# Patient Record
Sex: FEMALE | Race: BLACK OR AFRICAN AMERICAN | Employment: OTHER | URBAN - METROPOLITAN AREA
[De-identification: names, ages, dates, MRNs, and addresses within clinical notes are randomized per-mention and may not be internally consistent; named-entity substitution may affect disease eponyms.]

---

## 2017-01-19 DIAGNOSIS — I10 HYPERTENSION GOAL BP (BLOOD PRESSURE) < 130/80: ICD-10-CM

## 2017-01-19 DIAGNOSIS — F32.89 OTHER DEPRESSION: ICD-10-CM

## 2017-01-23 RX ORDER — DILTIAZEM HYDROCHLORIDE 120 MG/1
CAPSULE, EXTENDED RELEASE ORAL
Qty: 30 CAPSULE | Refills: 1 | Status: SHIPPED | OUTPATIENT
Start: 2017-01-23 | End: 2017-07-10 | Stop reason: SDUPTHER

## 2017-01-23 RX ORDER — BUSPIRONE HYDROCHLORIDE 5 MG/1
TABLET ORAL
Qty: 30 TABLET | Refills: 1 | Status: SHIPPED | OUTPATIENT
Start: 2017-01-23 | End: 2017-07-10 | Stop reason: SDUPTHER

## 2017-07-10 ENCOUNTER — OFFICE VISIT (OUTPATIENT)
Dept: INTERNAL MEDICINE CLINIC | Age: 82
End: 2017-07-10

## 2017-07-10 VITALS
SYSTOLIC BLOOD PRESSURE: 120 MMHG | BODY MASS INDEX: 28.53 KG/M2 | DIASTOLIC BLOOD PRESSURE: 70 MMHG | OXYGEN SATURATION: 98 % | HEART RATE: 60 BPM | HEIGHT: 63 IN | WEIGHT: 161 LBS

## 2017-07-10 DIAGNOSIS — E55.9 VITAMIN D DEFICIENCY: ICD-10-CM

## 2017-07-10 DIAGNOSIS — I10 ESSENTIAL HYPERTENSION: ICD-10-CM

## 2017-07-10 DIAGNOSIS — M25.552 PAIN OF LEFT HIP JOINT: ICD-10-CM

## 2017-07-10 DIAGNOSIS — G47.09 OTHER INSOMNIA: ICD-10-CM

## 2017-07-10 DIAGNOSIS — E11.69 TYPE 2 DIABETES MELLITUS WITH OTHER SPECIFIED COMPLICATION, WITHOUT LONG-TERM CURRENT USE OF INSULIN (HCC): Primary | ICD-10-CM

## 2017-07-10 DIAGNOSIS — F32.89 OTHER DEPRESSION: ICD-10-CM

## 2017-07-10 DIAGNOSIS — R63.4 WEIGHT LOSS: ICD-10-CM

## 2017-07-10 DIAGNOSIS — I63.89 CEREBROVASCULAR ACCIDENT (CVA) DUE TO OTHER MECHANISM (HCC): ICD-10-CM

## 2017-07-10 DIAGNOSIS — N28.9 RENAL INSUFFICIENCY: ICD-10-CM

## 2017-07-10 DIAGNOSIS — E78.5 HYPERLIPIDEMIA LDL GOAL <100: ICD-10-CM

## 2017-07-10 DIAGNOSIS — D64.89 ANEMIA DUE TO OTHER CAUSE: ICD-10-CM

## 2017-07-10 PROBLEM — I63.9 CEREBROVASCULAR ACCIDENT (CVA) (HCC): Status: ACTIVE | Noted: 2017-07-10

## 2017-07-10 LAB
GLUCOSE BLD-MCNC: 145 MG/DL
HBA1C MFR BLD: 6.2 %

## 2017-07-10 PROCEDURE — 99215 OFFICE O/P EST HI 40 MIN: CPT | Performed by: INTERNAL MEDICINE

## 2017-07-10 PROCEDURE — 83036 HEMOGLOBIN GLYCOSYLATED A1C: CPT | Performed by: INTERNAL MEDICINE

## 2017-07-10 PROCEDURE — 82962 GLUCOSE BLOOD TEST: CPT | Performed by: INTERNAL MEDICINE

## 2017-07-10 RX ORDER — BUSPIRONE HYDROCHLORIDE 5 MG/1
TABLET ORAL
Qty: 90 TABLET | Refills: 0 | Status: SHIPPED | OUTPATIENT
Start: 2017-07-10 | End: 2017-10-16 | Stop reason: SDUPTHER

## 2017-07-10 RX ORDER — HYDRALAZINE HYDROCHLORIDE 100 MG/1
100 TABLET, FILM COATED ORAL 2 TIMES DAILY
Qty: 180 TABLET | Refills: 0 | Status: SHIPPED | OUTPATIENT
Start: 2017-07-10 | End: 2018-07-16 | Stop reason: SDUPTHER

## 2017-07-10 RX ORDER — SPIRONOLACTONE 25 MG/1
25 TABLET ORAL DAILY
Qty: 90 TABLET | Refills: 0 | Status: SHIPPED | OUTPATIENT
Start: 2017-07-10 | End: 2017-09-28 | Stop reason: SDUPTHER

## 2017-07-10 RX ORDER — ASPIRIN AND DIPYRIDAMOLE 25; 200 MG/1; MG/1
CAPSULE, EXTENDED RELEASE ORAL
Qty: 180 CAPSULE | Refills: 1 | Status: SHIPPED | OUTPATIENT
Start: 2017-07-10 | End: 2018-07-02 | Stop reason: SDUPTHER

## 2017-07-10 RX ORDER — DILTIAZEM HYDROCHLORIDE 120 MG/1
CAPSULE, EXTENDED RELEASE ORAL
Qty: 90 CAPSULE | Refills: 0 | Status: SHIPPED | OUTPATIENT
Start: 2017-07-10 | End: 2017-09-28 | Stop reason: SDUPTHER

## 2017-07-10 RX ORDER — METOPROLOL SUCCINATE 25 MG/1
TABLET, EXTENDED RELEASE ORAL
Qty: 90 TABLET | Refills: 0 | Status: SHIPPED | OUTPATIENT
Start: 2017-07-10 | End: 2018-07-16 | Stop reason: SDUPTHER

## 2017-07-10 RX ORDER — TRAZODONE HYDROCHLORIDE 50 MG/1
TABLET ORAL
Qty: 90 TABLET | Refills: 0 | Status: SHIPPED | OUTPATIENT
Start: 2017-07-10 | End: 2017-10-16 | Stop reason: SDUPTHER

## 2017-07-10 RX ORDER — ATORVASTATIN CALCIUM 40 MG/1
TABLET, FILM COATED ORAL
Qty: 90 TABLET | Refills: 0 | Status: SHIPPED | OUTPATIENT
Start: 2017-07-10 | End: 2017-09-24 | Stop reason: SDUPTHER

## 2017-07-10 RX ORDER — ACETAMINOPHEN 325 MG/1
650 TABLET ORAL 2 TIMES DAILY
Qty: 120 TABLET | Refills: 0 | COMMUNITY
Start: 2017-07-10 | End: 2019-01-01

## 2017-07-10 RX ORDER — DOCUSATE SODIUM 100 MG/1
CAPSULE, LIQUID FILLED ORAL
Qty: 90 CAPSULE | Refills: 0 | Status: SHIPPED | OUTPATIENT
Start: 2017-07-10 | End: 2017-10-16 | Stop reason: SDUPTHER

## 2017-07-10 RX ORDER — SERTRALINE HYDROCHLORIDE 100 MG/1
TABLET, FILM COATED ORAL
Qty: 90 TABLET | Refills: 0 | Status: SHIPPED | OUTPATIENT
Start: 2017-07-10 | End: 2017-10-16 | Stop reason: SDUPTHER

## 2017-07-14 DIAGNOSIS — N28.9 RENAL INSUFFICIENCY: ICD-10-CM

## 2017-07-14 DIAGNOSIS — E11.69 TYPE 2 DIABETES MELLITUS WITH OTHER SPECIFIED COMPLICATION, WITHOUT LONG-TERM CURRENT USE OF INSULIN (HCC): ICD-10-CM

## 2017-07-14 DIAGNOSIS — E78.5 HYPERLIPIDEMIA LDL GOAL <100: ICD-10-CM

## 2017-07-14 DIAGNOSIS — E55.9 VITAMIN D DEFICIENCY: ICD-10-CM

## 2017-07-14 DIAGNOSIS — R63.4 WEIGHT LOSS: ICD-10-CM

## 2017-07-14 DIAGNOSIS — D64.89 ANEMIA DUE TO OTHER CAUSE: ICD-10-CM

## 2017-07-14 DIAGNOSIS — I10 ESSENTIAL HYPERTENSION: ICD-10-CM

## 2017-07-14 LAB
ALBUMIN SERPL-MCNC: 3.9 G/DL (ref 3.4–5)
ALP BLD-CCNC: 177 U/L (ref 40–129)
ALT SERPL-CCNC: 12 U/L (ref 10–40)
ANION GAP SERPL CALCULATED.3IONS-SCNC: 13 MMOL/L (ref 3–16)
AST SERPL-CCNC: 13 U/L (ref 15–37)
BASOPHILS ABSOLUTE: 0 K/UL (ref 0–0.2)
BASOPHILS RELATIVE PERCENT: 1.1 %
BILIRUB SERPL-MCNC: <0.2 MG/DL (ref 0–1)
BILIRUBIN DIRECT: <0.2 MG/DL (ref 0–0.3)
BILIRUBIN, INDIRECT: ABNORMAL MG/DL (ref 0–1)
BUN BLDV-MCNC: 27 MG/DL (ref 7–20)
CALCIUM SERPL-MCNC: 9.6 MG/DL (ref 8.3–10.6)
CHLORIDE BLD-SCNC: 106 MMOL/L (ref 99–110)
CHOLESTEROL, TOTAL: 154 MG/DL (ref 0–199)
CO2: 25 MMOL/L (ref 21–32)
CREAT SERPL-MCNC: 1.9 MG/DL (ref 0.6–1.2)
CREATININE URINE: 100.2 MG/DL (ref 28–259)
EOSINOPHILS ABSOLUTE: 0.2 K/UL (ref 0–0.6)
EOSINOPHILS RELATIVE PERCENT: 4.8 %
GFR AFRICAN AMERICAN: 30
GFR NON-AFRICAN AMERICAN: 25
GLUCOSE BLD-MCNC: 95 MG/DL (ref 70–99)
HCT VFR BLD CALC: 31.5 % (ref 36–48)
HDLC SERPL-MCNC: 70 MG/DL (ref 40–60)
HEMOGLOBIN: 9.9 G/DL (ref 12–16)
LDL CHOLESTEROL CALCULATED: 72 MG/DL
LYMPHOCYTES ABSOLUTE: 1.2 K/UL (ref 1–5.1)
LYMPHOCYTES RELATIVE PERCENT: 29 %
MCH RBC QN AUTO: 29.4 PG (ref 26–34)
MCHC RBC AUTO-ENTMCNC: 31.6 G/DL (ref 31–36)
MCV RBC AUTO: 93 FL (ref 80–100)
MICROALBUMIN UR-MCNC: <1.2 MG/DL
MICROALBUMIN/CREAT UR-RTO: NORMAL MG/G (ref 0–30)
MONOCYTES ABSOLUTE: 0.3 K/UL (ref 0–1.3)
MONOCYTES RELATIVE PERCENT: 8 %
NEUTROPHILS ABSOLUTE: 2.3 K/UL (ref 1.7–7.7)
NEUTROPHILS RELATIVE PERCENT: 57.1 %
PDW BLD-RTO: 15.6 % (ref 12.4–15.4)
PLATELET # BLD: 223 K/UL (ref 135–450)
PMV BLD AUTO: 8.9 FL (ref 5–10.5)
POTASSIUM SERPL-SCNC: 5 MMOL/L (ref 3.5–5.1)
RBC # BLD: 3.38 M/UL (ref 4–5.2)
SODIUM BLD-SCNC: 144 MMOL/L (ref 136–145)
TOTAL PROTEIN: 6.5 G/DL (ref 6.4–8.2)
TRIGL SERPL-MCNC: 59 MG/DL (ref 0–150)
TSH REFLEX: 2.02 UIU/ML (ref 0.27–4.2)
VITAMIN D 25-HYDROXY: 55.4 NG/ML
VLDLC SERPL CALC-MCNC: 12 MG/DL
WBC # BLD: 4 K/UL (ref 4–11)

## 2017-08-17 RX ORDER — LANCETS
EACH MISCELLANEOUS
Qty: 100 EACH | Refills: 3 | Status: SHIPPED | OUTPATIENT
Start: 2017-08-17 | End: 2018-01-01 | Stop reason: SDUPTHER

## 2017-09-24 DIAGNOSIS — E78.5 HYPERLIPIDEMIA LDL GOAL <100: ICD-10-CM

## 2017-09-27 RX ORDER — ATORVASTATIN CALCIUM 40 MG/1
TABLET, FILM COATED ORAL
Qty: 90 TABLET | Refills: 0 | Status: SHIPPED | OUTPATIENT
Start: 2017-09-27 | End: 2018-07-16 | Stop reason: SDUPTHER

## 2017-09-28 DIAGNOSIS — I10 ESSENTIAL HYPERTENSION: ICD-10-CM

## 2017-09-28 RX ORDER — DILTIAZEM HYDROCHLORIDE 120 MG/1
CAPSULE, EXTENDED RELEASE ORAL
Qty: 90 CAPSULE | Refills: 0 | Status: SHIPPED | OUTPATIENT
Start: 2017-09-28 | End: 2018-07-16 | Stop reason: SDUPTHER

## 2017-09-28 RX ORDER — SPIRONOLACTONE 25 MG/1
25 TABLET ORAL DAILY
Qty: 90 TABLET | Refills: 0 | Status: SHIPPED | OUTPATIENT
Start: 2017-09-28 | End: 2018-07-13 | Stop reason: SDUPTHER

## 2017-10-07 DIAGNOSIS — E78.5 HYPERLIPIDEMIA LDL GOAL <100: ICD-10-CM

## 2017-10-07 DIAGNOSIS — I10 ESSENTIAL HYPERTENSION: ICD-10-CM

## 2017-10-09 RX ORDER — SPIRONOLACTONE 25 MG/1
25 TABLET ORAL DAILY
Qty: 90 TABLET | Refills: 0 | OUTPATIENT
Start: 2017-10-09

## 2017-10-09 RX ORDER — ATORVASTATIN CALCIUM 40 MG/1
TABLET, FILM COATED ORAL
Qty: 90 TABLET | Refills: 0 | OUTPATIENT
Start: 2017-10-09

## 2017-10-09 RX ORDER — DILTIAZEM HYDROCHLORIDE 120 MG/1
CAPSULE, EXTENDED RELEASE ORAL
Qty: 90 CAPSULE | Refills: 0 | OUTPATIENT
Start: 2017-10-09

## 2017-10-16 ENCOUNTER — TELEPHONE (OUTPATIENT)
Dept: INTERNAL MEDICINE CLINIC | Age: 82
End: 2017-10-16

## 2017-10-16 DIAGNOSIS — G47.09 OTHER INSOMNIA: ICD-10-CM

## 2017-10-16 DIAGNOSIS — F32.89 OTHER DEPRESSION: ICD-10-CM

## 2017-10-17 ENCOUNTER — TELEPHONE (OUTPATIENT)
Dept: INTERNAL MEDICINE CLINIC | Age: 82
End: 2017-10-17

## 2017-10-20 ENCOUNTER — TELEPHONE (OUTPATIENT)
Dept: INTERNAL MEDICINE CLINIC | Age: 82
End: 2017-10-20

## 2017-10-20 RX ORDER — BLOOD SUGAR DIAGNOSTIC
STRIP MISCELLANEOUS
Qty: 100 STRIP | Refills: 0 | Status: SHIPPED | OUTPATIENT
Start: 2017-10-20 | End: 2018-06-05 | Stop reason: SDUPTHER

## 2017-10-20 RX ORDER — BUSPIRONE HYDROCHLORIDE 5 MG/1
TABLET ORAL
Qty: 90 TABLET | Refills: 0 | Status: SHIPPED | OUTPATIENT
Start: 2017-10-20 | End: 2018-07-16 | Stop reason: SDUPTHER

## 2017-10-20 RX ORDER — DOCUSATE SODIUM 100 MG/1
CAPSULE, LIQUID FILLED ORAL
Qty: 90 CAPSULE | Refills: 0 | Status: SHIPPED | OUTPATIENT
Start: 2017-10-20 | End: 2018-01-01 | Stop reason: SDUPTHER

## 2017-10-20 RX ORDER — TRAZODONE HYDROCHLORIDE 50 MG/1
TABLET ORAL
Qty: 90 TABLET | Refills: 0 | Status: SHIPPED | OUTPATIENT
Start: 2017-10-20 | End: 2018-07-16 | Stop reason: SDUPTHER

## 2017-10-20 RX ORDER — SERTRALINE HYDROCHLORIDE 100 MG/1
TABLET, FILM COATED ORAL
Qty: 90 TABLET | Refills: 0 | Status: SHIPPED | OUTPATIENT
Start: 2017-10-20 | End: 2018-07-16 | Stop reason: SDUPTHER

## 2017-10-20 NOTE — TELEPHONE ENCOUNTER
Patient out of multiple scripts need to refill Trezodone, buspirone, certaline and accuchek test strip

## 2017-11-03 ENCOUNTER — TELEPHONE (OUTPATIENT)
Dept: INTERNAL MEDICINE CLINIC | Age: 82
End: 2017-11-03

## 2017-11-06 NOTE — TELEPHONE ENCOUNTER
Called and spoke to daughter in regards to blood pressures. Daughter states that patient has already moved to [facility] and would like to know if blood pressure medications need to be changed.     Readings have been:    10/31/2017 - /70    11/1/2017 -    /63    11/2/2017 -  105/55    11/3/2017 - 107/57    11/4/2017 - 121/78    11/5/2017 - 110/71 and 116/72    11/6/2017 - 135/72 (without giving Hydralazine last night)     Alternate CBN : [phone] Albertina Sebas (daughter)

## 2018-01-01 ENCOUNTER — TELEPHONE (OUTPATIENT)
Dept: INTERNAL MEDICINE CLINIC | Age: 83
End: 2018-01-01

## 2018-01-01 DIAGNOSIS — E78.5 HYPERLIPIDEMIA LDL GOAL <100: ICD-10-CM

## 2018-01-01 DIAGNOSIS — F32.89 OTHER DEPRESSION: ICD-10-CM

## 2018-01-01 DIAGNOSIS — I10 ESSENTIAL HYPERTENSION: ICD-10-CM

## 2018-01-01 DIAGNOSIS — E11.69 TYPE 2 DIABETES MELLITUS WITH OTHER SPECIFIED COMPLICATION, WITHOUT LONG-TERM CURRENT USE OF INSULIN (HCC): ICD-10-CM

## 2018-01-01 RX ORDER — BLOOD SUGAR DIAGNOSTIC
STRIP MISCELLANEOUS
Qty: 100 STRIP | Refills: 0 | Status: SHIPPED | OUTPATIENT
Start: 2018-01-01 | End: 2019-01-01 | Stop reason: SDUPTHER

## 2018-01-01 RX ORDER — DILTIAZEM HYDROCHLORIDE 120 MG/1
CAPSULE, EXTENDED RELEASE ORAL
Qty: 90 CAPSULE | Refills: 0 | Status: SHIPPED | OUTPATIENT
Start: 2018-01-01 | End: 2019-01-01 | Stop reason: SDUPTHER

## 2018-01-01 RX ORDER — BUSPIRONE HYDROCHLORIDE 5 MG/1
TABLET ORAL
Qty: 90 TABLET | Refills: 0 | Status: SHIPPED | OUTPATIENT
Start: 2018-01-01

## 2018-01-01 RX ORDER — METOPROLOL SUCCINATE 25 MG/1
TABLET, EXTENDED RELEASE ORAL
Qty: 90 TABLET | Refills: 0 | Status: SHIPPED | OUTPATIENT
Start: 2018-01-01 | End: 2019-01-01 | Stop reason: SDUPTHER

## 2018-01-01 RX ORDER — HYDRALAZINE HYDROCHLORIDE 100 MG/1
TABLET, FILM COATED ORAL
Qty: 180 TABLET | Refills: 0 | Status: SHIPPED | OUTPATIENT
Start: 2018-01-01 | End: 2018-01-01 | Stop reason: SDUPTHER

## 2018-01-01 RX ORDER — ATORVASTATIN CALCIUM 40 MG/1
TABLET, FILM COATED ORAL
Qty: 90 TABLET | Refills: 0 | Status: SHIPPED | OUTPATIENT
Start: 2018-01-01 | End: 2018-01-01 | Stop reason: SDUPTHER

## 2018-01-01 RX ORDER — LANCETS
EACH MISCELLANEOUS
Qty: 100 EACH | Refills: 1 | Status: SHIPPED | OUTPATIENT
Start: 2018-01-01 | End: 2019-01-01 | Stop reason: SDUPTHER

## 2018-01-01 RX ORDER — DOCUSATE SODIUM 100 MG/1
CAPSULE, LIQUID FILLED ORAL
Qty: 90 CAPSULE | Refills: 0 | Status: SHIPPED | OUTPATIENT
Start: 2018-01-01

## 2018-01-01 RX ORDER — ATORVASTATIN CALCIUM 40 MG/1
TABLET, FILM COATED ORAL
Qty: 90 TABLET | Refills: 0 | Status: SHIPPED | OUTPATIENT
Start: 2018-01-01 | End: 2019-01-01 | Stop reason: SDUPTHER

## 2018-01-01 RX ORDER — HYDRALAZINE HYDROCHLORIDE 100 MG/1
TABLET, FILM COATED ORAL
Qty: 180 TABLET | Refills: 0 | Status: SHIPPED | OUTPATIENT
Start: 2018-01-01 | End: 2019-01-01 | Stop reason: SDUPTHER

## 2018-05-29 ENCOUNTER — TELEPHONE (OUTPATIENT)
Dept: INTERNAL MEDICINE CLINIC | Age: 83
End: 2018-05-29

## 2018-06-05 ENCOUNTER — TELEPHONE (OUTPATIENT)
Dept: INTERNAL MEDICINE CLINIC | Age: 83
End: 2018-06-05

## 2018-07-02 ENCOUNTER — TELEPHONE (OUTPATIENT)
Dept: INTERNAL MEDICINE CLINIC | Age: 83
End: 2018-07-02

## 2018-07-02 DIAGNOSIS — I63.89 CEREBROVASCULAR ACCIDENT (CVA) DUE TO OTHER MECHANISM (HCC): ICD-10-CM

## 2018-07-02 RX ORDER — ASPIRIN AND DIPYRIDAMOLE 25; 200 MG/1; MG/1
CAPSULE, EXTENDED RELEASE ORAL
Qty: 60 CAPSULE | Refills: 0 | Status: SHIPPED | OUTPATIENT
Start: 2018-07-02 | End: 2018-07-16 | Stop reason: SDUPTHER

## 2018-07-02 NOTE — TELEPHONE ENCOUNTER
medication called to wrong pharmacy needs to be called to out of town pharmacy patient has appointment scheduled to come in 3 weeks.

## 2018-07-13 ENCOUNTER — TELEPHONE (OUTPATIENT)
Dept: INTERNAL MEDICINE CLINIC | Age: 83
End: 2018-07-13

## 2018-07-13 DIAGNOSIS — I10 ESSENTIAL HYPERTENSION: ICD-10-CM

## 2018-07-13 RX ORDER — SPIRONOLACTONE 25 MG/1
25 TABLET ORAL DAILY
Qty: 30 TABLET | Refills: 0 | Status: SHIPPED | OUTPATIENT
Start: 2018-07-13 | End: 2018-07-16 | Stop reason: SDUPTHER

## 2018-07-13 NOTE — TELEPHONE ENCOUNTER
Patient requesting a medication refill.   Medication Spironolactone   Dosage 25MG   Frequency  Take 1 tablet by mouth daily  Last filled on  09/28/17  Pharmacy [facility] [address] [place] [address], [address] [place] [address] [place] [phone]  Next office visit 7/16/18

## 2018-07-16 ENCOUNTER — OFFICE VISIT (OUTPATIENT)
Dept: INTERNAL MEDICINE CLINIC | Age: 83
End: 2018-07-16

## 2018-07-16 VITALS
DIASTOLIC BLOOD PRESSURE: 68 MMHG | BODY MASS INDEX: 28.53 KG/M2 | SYSTOLIC BLOOD PRESSURE: 110 MMHG | HEIGHT: 63 IN | HEART RATE: 60 BPM | WEIGHT: 161 LBS | TEMPERATURE: 97.4 F | OXYGEN SATURATION: 97 %

## 2018-07-16 DIAGNOSIS — D64.9 CHRONIC ANEMIA: ICD-10-CM

## 2018-07-16 DIAGNOSIS — N18.30 CKD (CHRONIC KIDNEY DISEASE) STAGE 3, GFR 30-59 ML/MIN (HCC): ICD-10-CM

## 2018-07-16 DIAGNOSIS — E78.5 HYPERLIPIDEMIA LDL GOAL <100: ICD-10-CM

## 2018-07-16 DIAGNOSIS — I63.89 CEREBROVASCULAR ACCIDENT (CVA) DUE TO OTHER MECHANISM (HCC): ICD-10-CM

## 2018-07-16 DIAGNOSIS — I10 ESSENTIAL HYPERTENSION: ICD-10-CM

## 2018-07-16 DIAGNOSIS — E55.9 VITAMIN D DEFICIENCY: ICD-10-CM

## 2018-07-16 DIAGNOSIS — G47.09 OTHER INSOMNIA: ICD-10-CM

## 2018-07-16 DIAGNOSIS — E11.69 TYPE 2 DIABETES MELLITUS WITH OTHER SPECIFIED COMPLICATION, WITHOUT LONG-TERM CURRENT USE OF INSULIN (HCC): Primary | ICD-10-CM

## 2018-07-16 DIAGNOSIS — F32.89 OTHER DEPRESSION: ICD-10-CM

## 2018-07-16 LAB
GLUCOSE BLD-MCNC: 158 MG/DL
HBA1C MFR BLD: 5.9 %

## 2018-07-16 PROCEDURE — 83036 HEMOGLOBIN GLYCOSYLATED A1C: CPT | Performed by: INTERNAL MEDICINE

## 2018-07-16 PROCEDURE — 82962 GLUCOSE BLOOD TEST: CPT | Performed by: INTERNAL MEDICINE

## 2018-07-16 PROCEDURE — 99214 OFFICE O/P EST MOD 30 MIN: CPT | Performed by: INTERNAL MEDICINE

## 2018-07-16 RX ORDER — HYDRALAZINE HYDROCHLORIDE 100 MG/1
100 TABLET, FILM COATED ORAL 2 TIMES DAILY
Qty: 180 TABLET | Refills: 0 | Status: SHIPPED | OUTPATIENT
Start: 2018-07-16 | End: 2018-01-01 | Stop reason: SDUPTHER

## 2018-07-16 RX ORDER — DILTIAZEM HYDROCHLORIDE 120 MG/1
CAPSULE, EXTENDED RELEASE ORAL
Qty: 90 CAPSULE | Refills: 0 | Status: SHIPPED | OUTPATIENT
Start: 2018-07-16 | End: 2018-01-01 | Stop reason: SDUPTHER

## 2018-07-16 RX ORDER — TRAZODONE HYDROCHLORIDE 50 MG/1
TABLET ORAL
Qty: 90 TABLET | Refills: 0 | Status: SHIPPED | OUTPATIENT
Start: 2018-07-16 | End: 2019-01-01 | Stop reason: SDUPTHER

## 2018-07-16 RX ORDER — TRAZODONE HYDROCHLORIDE 50 MG/1
TABLET ORAL
Qty: 90 TABLET | Refills: 0 | Status: CANCELLED | OUTPATIENT
Start: 2018-07-16

## 2018-07-16 RX ORDER — SPIRONOLACTONE 25 MG/1
25 TABLET ORAL DAILY
Qty: 30 TABLET | Refills: 0 | Status: CANCELLED | OUTPATIENT
Start: 2018-07-16

## 2018-07-16 RX ORDER — MULTIVIT-MIN/IRON/FOLIC ACID/K 18-600-40
1 CAPSULE ORAL DAILY
Qty: 30 CAPSULE | Refills: 3 | Status: SHIPPED | OUTPATIENT
Start: 2018-07-16 | End: 2019-01-01 | Stop reason: SDUPTHER

## 2018-07-16 RX ORDER — LORATADINE 10 MG/1
10 TABLET ORAL DAILY PRN
Qty: 30 TABLET | Refills: 3 | Status: CANCELLED | OUTPATIENT
Start: 2018-07-16

## 2018-07-16 RX ORDER — LANCETS
EACH MISCELLANEOUS
Qty: 100 EACH | Refills: 3 | Status: CANCELLED | OUTPATIENT
Start: 2018-07-16

## 2018-07-16 RX ORDER — ATORVASTATIN CALCIUM 40 MG/1
TABLET, FILM COATED ORAL
Qty: 90 TABLET | Refills: 0 | Status: SHIPPED | OUTPATIENT
Start: 2018-07-16 | End: 2018-01-01 | Stop reason: SDUPTHER

## 2018-07-16 RX ORDER — DILTIAZEM HYDROCHLORIDE 120 MG/1
CAPSULE, EXTENDED RELEASE ORAL
Qty: 90 CAPSULE | Refills: 0 | Status: CANCELLED | OUTPATIENT
Start: 2018-07-16

## 2018-07-16 RX ORDER — SERTRALINE HYDROCHLORIDE 100 MG/1
TABLET, FILM COATED ORAL
Qty: 90 TABLET | Refills: 0 | Status: SHIPPED | OUTPATIENT
Start: 2018-07-16 | End: 2019-01-01 | Stop reason: SDUPTHER

## 2018-07-16 RX ORDER — BACLOFEN 10 MG/1
TABLET ORAL
Qty: 90 TABLET | Refills: 0 | Status: CANCELLED | OUTPATIENT
Start: 2018-07-16

## 2018-07-16 RX ORDER — NYSTATIN 100000 U/G
CREAM TOPICAL
Qty: 60 G | Refills: 1 | Status: CANCELLED | OUTPATIENT
Start: 2018-07-16

## 2018-07-16 RX ORDER — SPIRONOLACTONE 25 MG/1
25 TABLET ORAL DAILY
Qty: 90 TABLET | Refills: 0 | Status: SHIPPED | OUTPATIENT
Start: 2018-07-16 | End: 2019-01-01 | Stop reason: SDUPTHER

## 2018-07-16 RX ORDER — HYDRALAZINE HYDROCHLORIDE 100 MG/1
100 TABLET, FILM COATED ORAL 2 TIMES DAILY
Qty: 180 TABLET | Refills: 0 | Status: CANCELLED | OUTPATIENT
Start: 2018-07-16

## 2018-07-16 RX ORDER — ASPIRIN AND DIPYRIDAMOLE 25; 200 MG/1; MG/1
CAPSULE, EXTENDED RELEASE ORAL
Qty: 180 CAPSULE | Refills: 0 | Status: SHIPPED | OUTPATIENT
Start: 2018-07-16 | End: 2019-01-01 | Stop reason: SDUPTHER

## 2018-07-16 RX ORDER — BUSPIRONE HYDROCHLORIDE 5 MG/1
TABLET ORAL
Qty: 90 TABLET | Refills: 0 | Status: SHIPPED | OUTPATIENT
Start: 2018-07-16 | End: 2018-01-01 | Stop reason: SDUPTHER

## 2018-07-16 RX ORDER — ATORVASTATIN CALCIUM 40 MG/1
TABLET, FILM COATED ORAL
Qty: 90 TABLET | Refills: 0 | Status: CANCELLED | OUTPATIENT
Start: 2018-07-16

## 2018-07-16 RX ORDER — BUSPIRONE HYDROCHLORIDE 5 MG/1
TABLET ORAL
Qty: 90 TABLET | Refills: 0 | Status: CANCELLED | OUTPATIENT
Start: 2018-07-16

## 2018-07-16 RX ORDER — DOCUSATE SODIUM 100 MG/1
CAPSULE, LIQUID FILLED ORAL
Qty: 90 CAPSULE | Refills: 0 | Status: CANCELLED | OUTPATIENT
Start: 2018-07-16

## 2018-07-16 RX ORDER — METOPROLOL SUCCINATE 25 MG/1
TABLET, EXTENDED RELEASE ORAL
Qty: 90 TABLET | Refills: 0 | Status: SHIPPED | OUTPATIENT
Start: 2018-07-16 | End: 2018-01-01 | Stop reason: SDUPTHER

## 2018-07-16 RX ORDER — FLUTICASONE PROPIONATE 50 MCG
2 SPRAY, SUSPENSION (ML) NASAL DAILY PRN
Qty: 1 BOTTLE | Refills: 1 | Status: CANCELLED | OUTPATIENT
Start: 2018-07-16

## 2018-07-16 RX ORDER — METOPROLOL SUCCINATE 25 MG/1
TABLET, EXTENDED RELEASE ORAL
Qty: 90 TABLET | Refills: 0 | Status: CANCELLED | OUTPATIENT
Start: 2018-07-16

## 2018-07-16 RX ORDER — MULTIVIT-MIN/IRON/FOLIC ACID/K 18-600-40
1 CAPSULE ORAL DAILY
Qty: 30 CAPSULE | Refills: 3 | Status: CANCELLED | OUTPATIENT
Start: 2018-07-16

## 2018-07-16 ASSESSMENT — PATIENT HEALTH QUESTIONNAIRE - PHQ9
SUM OF ALL RESPONSES TO PHQ9 QUESTIONS 1 & 2: 0
2. FEELING DOWN, DEPRESSED OR HOPELESS: 0
SUM OF ALL RESPONSES TO PHQ QUESTIONS 1-9: 0
1. LITTLE INTEREST OR PLEASURE IN DOING THINGS: 0

## 2018-07-16 NOTE — PROGRESS NOTES
Barrett Castrejon MD   sertraline (ZOLOFT) 100 MG tablet TAKE 1 TABLET BY MOUTH EVERY DAY Yes Barrett Castrejon MD   diltiazem (DILT-XR) 120 MG extended release capsule TAKE 1 CAPSULE BY MOUTH EVERY DAY Yes Barrett Castrejon MD   atorvastatin (LIPITOR) 40 MG tablet TAKE 1 TABLET BY MOUTH EVERY EVENING Yes Barrett Castrejon MD   ACCU-CHEK FASTCLIX LANCETS [address] [facility] TEST TWICE DAILY Yes Barrett Castrejon MD   hydrALAZINE (APRESOLINE) 100 MG tablet Take 1 tablet by mouth 2 times daily Yes Barrett Castrejon MD   saxagliptin (ONGLYZA) 5 MG TABS tablet Take 1 tablet by mouth daily Yes Barrett Castrejon MD   metoprolol succinate (TOPROL XL) 25 MG extended release tablet TAKE 1 TABLET BY MOUTH DAILY Yes Barrett Castrejon MD   acetaminophen (AMINOFEN) 325 MG tablet Take 2 tablets by mouth 2 times daily Yes Barrett Castrejon MD   loratadine (CLARITIN) 10 MG tablet Take 1 tablet by mouth daily as needed. Yes Barrett Castrejon MD   fluticasone Brooksie Lout) 50 MCG/ACT nasal spray 2 sprays by Nasal route daily as needed for Rhinitis. Yes Barrett Castrejon MD   Cholecalciferol (VITAMIN D) 2000 UNITS CAPS capsule Take 1 capsule by mouth daily. Yes Barrett Castrejon MD   Simethicone 62.5 MG STRP Take 62.5 mg by mouth daily as needed (for gas). Yes Historical Provider, MD   fluticasone-salmeterol (ADVAIR) 100-50 MCG/DOSE diskus inhaler Inhale 1 puff into the lungs every 12 hours. Yes Historical Provider, MD   acetaminophen (TYLENOL) 325 MG tablet Take 650 mg by mouth every 6 hours as needed for Pain. Yes Historical Provider, MD   polyethyl glycol-propyl glycol 0.4-0.3 % (SYSTANE) 0.4-0.3 % ophthalmic solution Place 1 drop into both eyes as needed for Dry Eyes. Yes Historical Provider, MD   sennosides-docusate sodium (SENOKOT-S) 8.6-50 MG tablet Take 1 tablet by mouth 2 times daily. Yes Historical Provider, MD   lidocaine (LIDODERM) 5 % Place 1 patch onto the skin daily as needed. 12 hours on, 12 hours off.   Historical Provider, MD       ROS: COMPREHENSIVE ROS AS IN HX, REST -VE  History obtained from chart review and the patient    OBJECTIVE:   NURSING NOTE AND VITALS REVIEWED  /62 (Site: Right Arm, Position: Sitting, Cuff Size: Medium Adult)   Pulse 54   Temp 97.4 °F (36.3 °C) (Oral)   Ht 5' 3\" (1.6 m)   Wt 161 lb (73 kg)   SpO2 97%   BMI 28.52 kg/m²     NO ACUTE DISTRESS    REPEAT BP:  110/68 (RT)    REPEAT PULSE: 60 - MANUAL    Body mass index is 28.52 kg/m². HEENT: MILD PALLOR, ANICTERIC, PERRLA, EOMI, NO CONJUNCTIVAL ERYTHEMA,                 NO SINUS TENDERNESS  NECK:  SUPPLE, TRACHEA MIDLINE, NT, NO JVD, NO CB, NO LA, NO TM, NO STIFFNESS  CHEST: RESPY EFFORT NL, GOOD AE, NO W/R/C  HEART: S1S2+ REG, NO M/G/R  ABD: SOFT, NT, NO HSM, BS+  EXT: TRACE EDEMA, NT, PULSES +. KAITY'S -VE  NEURO: ALERT AND ORIENTED X 3, NO MENINGEAL SIGNS, NO TREMORS, AMBULATING IN WHEEL CHAIR, LT HEMIPARESIS - OLD  OTHERWISE, NO NEW FOCAL DEFICITS  PSYCH: FAIRLY GOOD AFFECT  BACK: NT, NO ROM, NO CVA TENDERNESS      PREVIOUS LABS REVIEWED AND D/W PT     ACCUCHECK: 158    POC HBA1C: 5.9        ASSESSMENT / PLAN:     Diagnosis Orders   1. Type 2 diabetes mellitus with other specified complication, without long-term current use of insulin ([address]  COUNSELLED. STABLE ON MED. DIET/ EXERCISE. MONITOR. GOAL FBS 80- 120, HBA1C < 7  ADVISED ON HOME BLOOD SUGAR MONITORING  F/U LABS  MAKE CHANGES AS NEEDED. 2. Essential hypertension  COUNSELLED. CONTINUE MEDS. LOW NA+ / DASH DIET/ EXERCISE. MONITOR. GOAL </= 120/80  MONITOR FOR HYPOTENSION, BRADYCARDIA  F/U LABS  MAKE CHANGES AS NEEDED. 3. Hyperlipidemia LDL goal <100  COUNSELLED. CONTINUE LIPITOR 40 MG   ADVISED LOW FAT / CHOL DIET/ EXERCISE.  MONITOR. F/U LABS  GOALS D/W PT.  MAKE CHANGES AS NEEDED. 4. Vitamin D deficiency  COUNSELLED. MONITOR ON VIT D SUPPLEMENT. MAKE CHANGES AS NEEDED. 5. CKD (chronic kidney disease) stage 3, GFR 30-59 ml/min  COUNSELLED. AVOID NSAIDS. MONITOR.   MAKE

## 2018-07-24 ENCOUNTER — OFFICE VISIT (OUTPATIENT)
Dept: DERMATOLOGY | Age: 83
End: 2018-07-24

## 2018-07-24 DIAGNOSIS — I10 ESSENTIAL HYPERTENSION: ICD-10-CM

## 2018-07-24 DIAGNOSIS — E11.69 TYPE 2 DIABETES MELLITUS WITH OTHER SPECIFIED COMPLICATION, WITHOUT LONG-TERM CURRENT USE OF INSULIN (HCC): ICD-10-CM

## 2018-07-24 DIAGNOSIS — E78.5 HYPERLIPIDEMIA LDL GOAL <100: ICD-10-CM

## 2018-07-24 DIAGNOSIS — D64.9 CHRONIC ANEMIA: ICD-10-CM

## 2018-07-24 DIAGNOSIS — L82.0 INFLAMED SEBORRHEIC KERATOSIS: ICD-10-CM

## 2018-07-24 DIAGNOSIS — L30.4 INTERTRIGO: Primary | ICD-10-CM

## 2018-07-24 DIAGNOSIS — N18.30 CKD (CHRONIC KIDNEY DISEASE) STAGE 3, GFR 30-59 ML/MIN (HCC): ICD-10-CM

## 2018-07-24 DIAGNOSIS — E55.9 VITAMIN D DEFICIENCY: ICD-10-CM

## 2018-07-24 LAB
A/G RATIO: 1.5 (ref 1.1–2.2)
ALBUMIN SERPL-MCNC: 4.1 G/DL (ref 3.4–5)
ALP BLD-CCNC: 164 U/L (ref 40–129)
ALT SERPL-CCNC: 11 U/L (ref 10–40)
ANION GAP SERPL CALCULATED.3IONS-SCNC: 14 MMOL/L (ref 3–16)
AST SERPL-CCNC: 13 U/L (ref 15–37)
BASOPHILS ABSOLUTE: 0 K/UL (ref 0–0.2)
BASOPHILS RELATIVE PERCENT: 1.2 %
BILIRUB SERPL-MCNC: <0.2 MG/DL (ref 0–1)
BUN BLDV-MCNC: 29 MG/DL (ref 7–20)
CALCIUM SERPL-MCNC: 9.6 MG/DL (ref 8.3–10.6)
CHLORIDE BLD-SCNC: 107 MMOL/L (ref 99–110)
CHOLESTEROL, TOTAL: 154 MG/DL (ref 0–199)
CO2: 22 MMOL/L (ref 21–32)
CREAT SERPL-MCNC: 2.1 MG/DL (ref 0.6–1.2)
EOSINOPHILS ABSOLUTE: 0.1 K/UL (ref 0–0.6)
EOSINOPHILS RELATIVE PERCENT: 4.4 %
FERRITIN: 29.4 NG/ML (ref 15–150)
FOLATE: 6.86 NG/ML (ref 4.78–24.2)
GFR AFRICAN AMERICAN: 27
GFR NON-AFRICAN AMERICAN: 22
GLOBULIN: 2.7 G/DL
GLUCOSE BLD-MCNC: 90 MG/DL (ref 70–99)
HCT VFR BLD CALC: 32.5 % (ref 36–48)
HDLC SERPL-MCNC: 69 MG/DL (ref 40–60)
HEMOGLOBIN: 10.3 G/DL (ref 12–16)
IRON SATURATION: 21 % (ref 15–50)
IRON: 57 UG/DL (ref 37–145)
LDL CHOLESTEROL CALCULATED: 71 MG/DL
LYMPHOCYTES ABSOLUTE: 1.1 K/UL (ref 1–5.1)
LYMPHOCYTES RELATIVE PERCENT: 32.5 %
MCH RBC QN AUTO: 29.6 PG (ref 26–34)
MCHC RBC AUTO-ENTMCNC: 31.8 G/DL (ref 31–36)
MCV RBC AUTO: 93 FL (ref 80–100)
MONOCYTES ABSOLUTE: 0.3 K/UL (ref 0–1.3)
MONOCYTES RELATIVE PERCENT: 8.8 %
NEUTROPHILS ABSOLUTE: 1.8 K/UL (ref 1.7–7.7)
NEUTROPHILS RELATIVE PERCENT: 53.1 %
PDW BLD-RTO: 14.1 % (ref 12.4–15.4)
PLATELET # BLD: 174 K/UL (ref 135–450)
PMV BLD AUTO: 8.9 FL (ref 5–10.5)
POTASSIUM SERPL-SCNC: 4.6 MMOL/L (ref 3.5–5.1)
RBC # BLD: 3.49 M/UL (ref 4–5.2)
SODIUM BLD-SCNC: 143 MMOL/L (ref 136–145)
TOTAL IRON BINDING CAPACITY: 267 UG/DL (ref 260–445)
TOTAL PROTEIN: 6.8 G/DL (ref 6.4–8.2)
TRIGL SERPL-MCNC: 69 MG/DL (ref 0–150)
TSH REFLEX: 1.98 UIU/ML (ref 0.27–4.2)
VITAMIN B-12: 361 PG/ML (ref 211–911)
VITAMIN D 25-HYDROXY: 53.5 NG/ML
VLDLC SERPL CALC-MCNC: 14 MG/DL
WBC # BLD: 3.3 K/UL (ref 4–11)

## 2018-07-24 PROCEDURE — 17110 DESTRUCTION B9 LES UP TO 14: CPT | Performed by: DERMATOLOGY

## 2018-07-24 PROCEDURE — 99213 OFFICE O/P EST LOW 20 MIN: CPT | Performed by: DERMATOLOGY

## 2018-07-24 NOTE — PROGRESS NOTES
TAKE 1 TABLET BY MOUTH EVERY DAY 90 tablet 0    diltiazem (DILT-XR) 120 MG extended release capsule TAKE 1 CAPSULE BY MOUTH EVERY DAY 90 capsule 0    busPIRone (BUSPAR) 5 MG tablet TAKE 1 TABLET BY MOUTH EVERY DAY 90 tablet 0    atorvastatin (LIPITOR) 40 MG tablet TAKE 1 TABLET BY MOUTH EVERY EVENING 90 tablet 0    hydrALAZINE (APRESOLINE) 100 MG tablet Take 1 tablet by mouth 2 times daily 180 tablet 0    saxagliptin (ONGLYZA) 5 MG TABS tablet Take 1 tablet by mouth daily 90 tablet 0    metoprolol succinate (TOPROL XL) 25 MG extended release tablet TAKE 1 TABLET BY MOUTH DAILY 90 tablet 0    Cholecalciferol (VITAMIN D) 2000 units CAPS capsule Take 1 capsule by mouth daily 30 capsule 3    traZODone (DESYREL) 50 MG tablet TAKE 1 TABLET BY MOUTH EVERY NIGHT AS NEEDED FOR SLEEP 90 tablet 0    glucose blood VI test strips (ACCU-CHEK JANAE PLUS) strip TEST TWICE DAILY 100 strip 0    baclofen (LIORESAL) 10 MG tablet TAKE 1 TABLET BY MOUTH EVERY NIGHT AT BEDTIME 90 tablet 0     MG capsule TAKE 1 CAPSULE BY MOUTH DAILY 90 capsule 0    ACCU-CHEK FASTCLIX LANCETS MISC TEST TWICE DAILY 100 each 3    acetaminophen (AMINOFEN) 325 MG tablet Take 2 tablets by mouth 2 times daily 120 tablet 0    loratadine (CLARITIN) 10 MG tablet Take 1 tablet by mouth daily as needed. 30 tablet 3    fluticasone (FLONASE) 50 MCG/ACT nasal spray 2 sprays by Nasal route daily as needed for Rhinitis. 1 Bottle 1    Simethicone 62.5 MG STRP Take 62.5 mg by mouth daily as needed (for gas).  fluticasone-salmeterol (ADVAIR) 100-50 MCG/DOSE diskus inhaler Inhale 1 puff into the lungs every 12 hours.  acetaminophen (TYLENOL) 325 MG tablet Take 650 mg by mouth every 6 hours as needed for Pain.  polyethyl glycol-propyl glycol 0.4-0.3 % (SYSTANE) 0.4-0.3 % ophthalmic solution Place 1 drop into both eyes as needed for Dry Eyes.          Physical Examination     The following were examined and determined to be normal: Psych/Neuro. The following were examined and determined to be abnormal: Breast/axilla/chest.     -General: Well-appearing, NAD  1. Inframammary skin - moist, mildly erythematous. No papules, pustules. Several well-defined \"stuck-on\" verrucous tan-brown papule(s) w/ surrounding bright erythema     Assessment and Plan     1a. Intertrigo  -Edu re: relationship w/ friction, warm/moist environment; recurrence. -Recommended hair dryer on cool setting after showers (and as needed throughout the day) to keep skin as cool and dry as possible. 1b. Inflamed seborrheic keratosis(es)  -Reassurance re: benignity  -9 lesion(s) treated w/ liquid nitrogen x 2 cycles - R 4 and L 5 inframammary skin. Edu re: risk of blister formation, discomfort, scar, hypopigmentation. Discussed wound care.

## 2018-07-27 ENCOUNTER — TELEPHONE (OUTPATIENT)
Dept: INTERNAL MEDICINE CLINIC | Age: 83
End: 2018-07-27

## 2018-07-31 RX ORDER — BLOOD-GLUCOSE METER
1 KIT MISCELLANEOUS 2 TIMES DAILY
Qty: 1 KIT | Refills: 0 | Status: SHIPPED | OUTPATIENT
Start: 2018-07-31 | End: 2019-01-01

## 2018-07-31 RX ORDER — NEBULIZER AND COMPRESSOR
1 EACH MISCELLANEOUS DAILY
Qty: 1 KIT | Refills: 0 | Status: SHIPPED | OUTPATIENT
Start: 2018-07-31 | End: 2019-01-01

## 2018-08-13 ENCOUNTER — TELEPHONE (OUTPATIENT)
Dept: INTERNAL MEDICINE CLINIC | Age: 83
End: 2018-08-13

## 2018-08-13 DIAGNOSIS — I63.89 CEREBROVASCULAR ACCIDENT (CVA) DUE TO OTHER MECHANISM (HCC): Primary | ICD-10-CM

## 2019-01-01 ENCOUNTER — TELEPHONE (OUTPATIENT)
Dept: INTERNAL MEDICINE CLINIC | Age: 84
End: 2019-01-01

## 2019-01-01 ENCOUNTER — APPOINTMENT (OUTPATIENT)
Dept: CT IMAGING | Age: 84
End: 2019-01-01
Payer: OTHER MISCELLANEOUS

## 2019-01-01 ENCOUNTER — APPOINTMENT (OUTPATIENT)
Dept: GENERAL RADIOLOGY | Age: 84
End: 2019-01-01
Payer: OTHER MISCELLANEOUS

## 2019-01-01 ENCOUNTER — OFFICE VISIT (OUTPATIENT)
Dept: INTERNAL MEDICINE CLINIC | Age: 84
End: 2019-01-01
Payer: COMMERCIAL

## 2019-01-01 ENCOUNTER — HOSPITAL ENCOUNTER (EMERGENCY)
Age: 84
Discharge: ANOTHER HOSPITAL | End: 2019-08-21
Attending: EMERGENCY MEDICINE
Payer: OTHER MISCELLANEOUS

## 2019-01-01 ENCOUNTER — HOSPITAL ENCOUNTER (OUTPATIENT)
Dept: CT IMAGING | Age: 84
Discharge: HOME | End: 2019-07-23
Payer: MEDICARE

## 2019-01-01 ENCOUNTER — OFFICE VISIT (OUTPATIENT)
Dept: INTERNAL MEDICINE CLINIC | Age: 84
End: 2019-01-01
Payer: MEDICARE

## 2019-01-01 VITALS
OXYGEN SATURATION: 97 % | SYSTOLIC BLOOD PRESSURE: 130 MMHG | HEART RATE: 66 BPM | TEMPERATURE: 96.7 F | BODY MASS INDEX: 30.65 KG/M2 | WEIGHT: 173 LBS | HEIGHT: 63 IN | DIASTOLIC BLOOD PRESSURE: 80 MMHG

## 2019-01-01 VITALS
DIASTOLIC BLOOD PRESSURE: 57 MMHG | OXYGEN SATURATION: 94 % | SYSTOLIC BLOOD PRESSURE: 66 MMHG | TEMPERATURE: 98.3 F | RESPIRATION RATE: 16 BRPM | BODY MASS INDEX: 32.42 KG/M2 | WEIGHT: 183 LBS | HEART RATE: 67 BPM

## 2019-01-01 VITALS
TEMPERATURE: 98.2 F | OXYGEN SATURATION: 92 % | DIASTOLIC BLOOD PRESSURE: 70 MMHG | SYSTOLIC BLOOD PRESSURE: 124 MMHG | HEIGHT: 63 IN | BODY MASS INDEX: 30.65 KG/M2 | HEART RATE: 60 BPM

## 2019-01-01 DIAGNOSIS — I10 ESSENTIAL HYPERTENSION: ICD-10-CM

## 2019-01-01 DIAGNOSIS — R74.8 ELEVATED LIPASE: Primary | ICD-10-CM

## 2019-01-01 DIAGNOSIS — R58 INTERNAL HEMORRHAGE: ICD-10-CM

## 2019-01-01 DIAGNOSIS — E78.5 HYPERLIPIDEMIA LDL GOAL <100: ICD-10-CM

## 2019-01-01 DIAGNOSIS — N18.4 CHRONIC KIDNEY DISEASE, STAGE IV (SEVERE) (HCC): ICD-10-CM

## 2019-01-01 DIAGNOSIS — M54.5 ACUTE RIGHT-SIDED LOW BACK PAIN, WITH SCIATICA PRESENCE UNSPECIFIED: Primary | ICD-10-CM

## 2019-01-01 DIAGNOSIS — I63.89 CEREBROVASCULAR ACCIDENT (CVA) DUE TO OTHER MECHANISM (HCC): ICD-10-CM

## 2019-01-01 DIAGNOSIS — N18.30 CKD (CHRONIC KIDNEY DISEASE) STAGE 3, GFR 30-59 ML/MIN (HCC): ICD-10-CM

## 2019-01-01 DIAGNOSIS — G47.09 OTHER INSOMNIA: ICD-10-CM

## 2019-01-01 DIAGNOSIS — E11.69 TYPE 2 DIABETES MELLITUS WITH OTHER SPECIFIED COMPLICATION, WITHOUT LONG-TERM CURRENT USE OF INSULIN (HCC): ICD-10-CM

## 2019-01-01 DIAGNOSIS — J20.8 ACUTE BRONCHITIS DUE TO OTHER SPECIFIED ORGANISMS: ICD-10-CM

## 2019-01-01 DIAGNOSIS — R79.89 HIGH SERUM VITAMIN D: ICD-10-CM

## 2019-01-01 DIAGNOSIS — S36.420A: ICD-10-CM

## 2019-01-01 DIAGNOSIS — E11.69 TYPE 2 DIABETES MELLITUS WITH OTHER SPECIFIED COMPLICATION, WITHOUT LONG-TERM CURRENT USE OF INSULIN (HCC): Primary | ICD-10-CM

## 2019-01-01 DIAGNOSIS — E55.9 VITAMIN D DEFICIENCY: ICD-10-CM

## 2019-01-01 DIAGNOSIS — R74.8 ELEVATED LIPASE: ICD-10-CM

## 2019-01-01 DIAGNOSIS — F32.89 OTHER DEPRESSION: ICD-10-CM

## 2019-01-01 DIAGNOSIS — D64.9 CHRONIC ANEMIA: ICD-10-CM
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
## 2019-01-01 LAB
A/G RATIO: 1.7 (ref 1.1–2.2)
ABO/RH: NORMAL
ALBUMIN SERPL-MCNC: 4.2 G/DL (ref 3.4–5)
ALP BLD-CCNC: 154 U/L (ref 40–129)
ALT SERPL-CCNC: 13 U/L (ref 10–40)
ANION GAP SERPL CALCULATED.3IONS-SCNC: 13 MMOL/L (ref 3–16)
ANION GAP SERPL CALCULATED.3IONS-SCNC: 13 MMOL/L (ref 3–16)
ANION GAP SERPL CALCULATED.3IONS-SCNC: 22 MMOL/L (ref 3–16)
ANTIBODY SCREEN: NORMAL
AST SERPL-CCNC: 16 U/L (ref 15–37)
BASE EXCESS VENOUS: -16 (ref -3–3)
BASOPHILS ABSOLUTE: 0 K/UL (ref 0–0.2)
BASOPHILS ABSOLUTE: 0 K/UL (ref 0–0.2)
BASOPHILS RELATIVE PERCENT: 0.5 %
BASOPHILS RELATIVE PERCENT: 0.8 %
BILIRUB SERPL-MCNC: <0.2 MG/DL (ref 0–1)
BLOOD BANK DISPENSE STATUS: NORMAL
BLOOD BANK PRODUCT CODE: NORMAL
BPU ID: NORMAL
BUN BLDV-MCNC: 29 MG/DL (ref 7–20)
BUN BLDV-MCNC: 30 MG/DL (ref 7–20)
BUN BLDV-MCNC: 34 MG/DL (ref 7–20)
CALCIUM SERPL-MCNC: 10 MG/DL (ref 8.3–10.6)
CALCIUM SERPL-MCNC: 10.2 MG/DL (ref 8.3–10.6)
CALCIUM SERPL-MCNC: 10.2 MG/DL (ref 8.3–10.6)
CHLORIDE BLD-SCNC: 104 MMOL/L (ref 99–110)
CHLORIDE BLD-SCNC: 105 MMOL/L (ref 99–110)
CHLORIDE BLD-SCNC: 99 MMOL/L (ref 99–110)
CHP ED QC CHECK: NORMAL
CHP ED QC CHECK: NORMAL
CO2: 15 MMOL/L (ref 21–32)
CO2: 21 MMOL/L (ref 21–32)
CO2: 22 MMOL/L (ref 21–32)
CREAT SERPL-MCNC: 2.3 MG/DL (ref 0.6–1.2)
CREAT SERPL-MCNC: 2.3 MG/DL (ref 0.6–1.2)
CREAT SERPL-MCNC: 2.7 MG/DL (ref 0.6–1.2)
DESCRIPTION BLOOD BANK: NORMAL
EKG ATRIAL RATE: 105 BPM
EKG DIAGNOSIS: NORMAL
EKG Q-T INTERVAL: 330 MS
EKG QRS DURATION: 84 MS
EKG QTC CALCULATION (BAZETT): 432 MS
EKG R AXIS: 22 DEGREES
EKG T AXIS: 68 DEGREES
EKG VENTRICULAR RATE: 103 BPM
EOSINOPHILS ABSOLUTE: 0.1 K/UL (ref 0–0.6)
EOSINOPHILS ABSOLUTE: 0.2 K/UL (ref 0–0.6)
EOSINOPHILS RELATIVE PERCENT: 2 %
EOSINOPHILS RELATIVE PERCENT: 5.4 %
GFR AFRICAN AMERICAN: 20
GFR AFRICAN AMERICAN: 24
GFR AFRICAN AMERICAN: 24
GFR NON-AFRICAN AMERICAN: 17
GFR NON-AFRICAN AMERICAN: 20
GFR NON-AFRICAN AMERICAN: 20
GLOBULIN: 2.5 G/DL
GLUCOSE BLD-MCNC: 119 MG/DL
GLUCOSE BLD-MCNC: 126 MG/DL (ref 70–99)
GLUCOSE BLD-MCNC: 147 MG/DL
GLUCOSE BLD-MCNC: 246 MG/DL (ref 70–99)
GLUCOSE BLD-MCNC: 292 MG/DL (ref 70–99)
GLUCOSE BLD-MCNC: 96 MG/DL (ref 70–99)
HBA1C MFR BLD: 5.9 %
HCO3 VENOUS: 13.9 MMOL/L (ref 23–29)
HCT VFR BLD CALC: 31.1 % (ref 36–48)
HCT VFR BLD CALC: 33.3 % (ref 36–48)
HEMOGLOBIN: 10.5 G/DL (ref 12–16)
HEMOGLOBIN: 9.6 G/DL (ref 12–16)
INR BLD: 0.98 (ref 0.86–1.14)
LACTATE: 10.28 MMOL/L (ref 0.4–2)
LIPASE: 143 U/L (ref 13–60)
LIPASE: 90 U/L (ref 13–60)
LYMPHOCYTES ABSOLUTE: 0.9 K/UL (ref 1–5.1)
LYMPHOCYTES ABSOLUTE: 2.5 K/UL (ref 1–5.1)
LYMPHOCYTES RELATIVE PERCENT: 31.1 %
LYMPHOCYTES RELATIVE PERCENT: 41.7 %
MCH RBC QN AUTO: 30.3 PG (ref 26–34)
MCH RBC QN AUTO: 30.7 PG (ref 26–34)
MCHC RBC AUTO-ENTMCNC: 30.8 G/DL (ref 31–36)
MCHC RBC AUTO-ENTMCNC: 31.6 G/DL (ref 31–36)
MCV RBC AUTO: 95.7 FL (ref 80–100)
MCV RBC AUTO: 99.9 FL (ref 80–100)
MONOCYTES ABSOLUTE: 0.2 K/UL (ref 0–1.3)
MONOCYTES ABSOLUTE: 0.2 K/UL (ref 0–1.3)
MONOCYTES RELATIVE PERCENT: 4.1 %
MONOCYTES RELATIVE PERCENT: 7.9 %
NEUTROPHILS ABSOLUTE: 1.7 K/UL (ref 1.7–7.7)
NEUTROPHILS ABSOLUTE: 3.1 K/UL (ref 1.7–7.7)
NEUTROPHILS RELATIVE PERCENT: 51.7 %
NEUTROPHILS RELATIVE PERCENT: 54.8 %
O2 SAT, VEN: 25 %
PCO2, VEN: 46.5 MM HG (ref 40–50)
PDW BLD-RTO: 14.3 % (ref 12.4–15.4)
PDW BLD-RTO: 14.5 % (ref 12.4–15.4)
PERFORMED ON: ABNORMAL
PERFORMED ON: ABNORMAL
PH VENOUS: 7.08 (ref 7.35–7.45)
PLATELET # BLD: 183 K/UL (ref 135–450)
PLATELET # BLD: 191 K/UL (ref 135–450)
PMV BLD AUTO: 9 FL (ref 5–10.5)
PMV BLD AUTO: 9.4 FL (ref 5–10.5)
PO2, VEN: 24 MM HG
POC SAMPLE TYPE: ABNORMAL
POTASSIUM REFLEX MAGNESIUM: 4.2 MMOL/L (ref 3.5–5.1)
POTASSIUM SERPL-SCNC: 4.6 MMOL/L (ref 3.5–5.1)
POTASSIUM SERPL-SCNC: 5.1 MMOL/L (ref 3.5–5.1)
PROTHROMBIN TIME: 11.2 SEC (ref 9.8–13)
RBC # BLD: 3.11 M/UL (ref 4–5.2)
RBC # BLD: 3.48 M/UL (ref 4–5.2)
SODIUM BLD-SCNC: 136 MMOL/L (ref 136–145)
SODIUM BLD-SCNC: 138 MMOL/L (ref 136–145)
SODIUM BLD-SCNC: 140 MMOL/L (ref 136–145)
TCO2 CALC VENOUS: 15 MMOL/L
TOTAL PROTEIN: 6.7 G/DL (ref 6.4–8.2)
TROPONIN: <0.01 NG/ML
TSH REFLEX: 1.71 UIU/ML (ref 0.27–4.2)
VITAMIN D 25-HYDROXY: 84.9 NG/ML
WBC # BLD: 3 K/UL (ref 4–11)
WBC # BLD: 5.9 K/UL (ref 4–11)

## 2019-01-01 PROCEDURE — 93005 ELECTROCARDIOGRAM TRACING: CPT | Performed by: STUDENT IN AN ORGANIZED HEALTH CARE EDUCATION/TRAINING PROGRAM

## 2019-01-01 PROCEDURE — 72125 CT NECK SPINE W/O DYE: CPT

## 2019-01-01 PROCEDURE — 80048 BASIC METABOLIC PNL TOTAL CA: CPT

## 2019-01-01 PROCEDURE — 76376 3D RENDER W/INTRP POSTPROCES: CPT

## 2019-01-01 PROCEDURE — 96374 THER/PROPH/DIAG INJ IV PUSH: CPT

## 2019-01-01 PROCEDURE — 2700000000 HC OXYGEN THERAPY PER DAY

## 2019-01-01 PROCEDURE — 82962 GLUCOSE BLOOD TEST: CPT | Performed by: INTERNAL MEDICINE

## 2019-01-01 PROCEDURE — 96375 TX/PRO/DX INJ NEW DRUG ADDON: CPT

## 2019-01-01 PROCEDURE — 6360000002 HC RX W HCPCS: Performed by: EMERGENCY MEDICINE

## 2019-01-01 PROCEDURE — 83036 HEMOGLOBIN GLYCOSYLATED A1C: CPT | Performed by: INTERNAL MEDICINE

## 2019-01-01 PROCEDURE — 74176 CT ABD & PELVIS W/O CONTRAST: CPT

## 2019-01-01 PROCEDURE — 4500000026 HC ED CRITICAL CARE PROCEDURE

## 2019-01-01 PROCEDURE — 85025 COMPLETE CBC W/AUTO DIFF WBC: CPT

## 2019-01-01 PROCEDURE — 83605 ASSAY OF LACTIC ACID: CPT

## 2019-01-01 PROCEDURE — 71045 X-RAY EXAM CHEST 1 VIEW: CPT

## 2019-01-01 PROCEDURE — 86901 BLOOD TYPING SEROLOGIC RH(D): CPT

## 2019-01-01 PROCEDURE — 86850 RBC ANTIBODY SCREEN: CPT

## 2019-01-01 PROCEDURE — 85610 PROTHROMBIN TIME: CPT

## 2019-01-01 PROCEDURE — 99214 OFFICE O/P EST MOD 30 MIN: CPT | Performed by: INTERNAL MEDICINE

## 2019-01-01 PROCEDURE — 86920 COMPATIBILITY TEST SPIN: CPT

## 2019-01-01 PROCEDURE — 82803 BLOOD GASES ANY COMBINATION: CPT

## 2019-01-01 PROCEDURE — 99291 CRITICAL CARE FIRST HOUR: CPT

## 2019-01-01 PROCEDURE — 84484 ASSAY OF TROPONIN QUANT: CPT

## 2019-01-01 PROCEDURE — 94770 HC ETCO2 MONITOR DAILY: CPT

## 2019-01-01 PROCEDURE — 36430 TRANSFUSION BLD/BLD COMPNT: CPT

## 2019-01-01 PROCEDURE — 70450 CT HEAD/BRAIN W/O DYE: CPT

## 2019-01-01 PROCEDURE — 2500000003 HC RX 250 WO HCPCS: Performed by: EMERGENCY MEDICINE

## 2019-01-01 PROCEDURE — 36415 COLL VENOUS BLD VENIPUNCTURE: CPT

## 2019-01-01 PROCEDURE — P9016 RBC LEUKOCYTES REDUCED: HCPCS

## 2019-01-01 PROCEDURE — 86900 BLOOD TYPING SEROLOGIC ABO: CPT

## 2019-01-01 RX ORDER — MULTIVIT-MIN/IRON/FOLIC ACID/K 18-600-40
1 CAPSULE ORAL DAILY
Qty: 30 CAPSULE | Refills: 3 | Status: SHIPPED | OUTPATIENT
Start: 2019-01-01 | End: 2019-01-01 | Stop reason: ALTCHOICE

## 2019-01-01 RX ORDER — HYDRALAZINE HYDROCHLORIDE 100 MG/1
TABLET, FILM COATED ORAL
Qty: 180 TABLET | Refills: 0 | Status: SHIPPED | OUTPATIENT
Start: 2019-01-01

## 2019-01-01 RX ORDER — SODIUM CHLORIDE 9 MG/ML
INJECTION, SOLUTION INTRAVENOUS
Status: DISCONTINUED
Start: 2019-01-01 | End: 2019-01-01 | Stop reason: HOSPADM

## 2019-01-01 RX ORDER — LANCETS
EACH MISCELLANEOUS
Qty: 100 EACH | Refills: 0 | Status: SHIPPED | OUTPATIENT
Start: 2019-01-01 | End: 2019-01-01

## 2019-01-01 RX ORDER — ASPIRIN AND DIPYRIDAMOLE 25; 200 MG/1; MG/1
CAPSULE, EXTENDED RELEASE ORAL
Qty: 180 CAPSULE | Refills: 0 | Status: SHIPPED | OUTPATIENT
Start: 2019-01-01

## 2019-01-01 RX ORDER — BLOOD SUGAR DIAGNOSTIC
STRIP MISCELLANEOUS
Qty: 100 STRIP | Refills: 0 | Status: SHIPPED | OUTPATIENT
Start: 2019-01-01 | End: 2019-01-01

## 2019-01-01 RX ORDER — PIOGLITAZONEHYDROCHLORIDE 15 MG/1
15 TABLET ORAL DAILY
Qty: 30 TABLET | Refills: 3 | Status: SHIPPED | OUTPATIENT
Start: 2019-01-01

## 2019-01-01 RX ORDER — AZITHROMYCIN 250 MG/1
250 TABLET, FILM COATED ORAL SEE ADMIN INSTRUCTIONS
Qty: 6 TABLET | Refills: 0 | Status: SHIPPED | OUTPATIENT
Start: 2019-01-01 | End: 2019-01-01 | Stop reason: ALTCHOICE

## 2019-01-01 RX ORDER — SERTRALINE HYDROCHLORIDE 100 MG/1
TABLET, FILM COATED ORAL
Qty: 90 TABLET | Refills: 0 | Status: SHIPPED | OUTPATIENT
Start: 2019-01-01

## 2019-01-01 RX ORDER — METOPROLOL SUCCINATE 25 MG/1
TABLET, EXTENDED RELEASE ORAL
Qty: 30 TABLET | Refills: 0 | Status: SHIPPED | OUTPATIENT
Start: 2019-01-01 | End: 2019-01-01 | Stop reason: SDUPTHER

## 2019-01-01 RX ORDER — FENTANYL CITRATE 50 UG/ML
INJECTION, SOLUTION INTRAMUSCULAR; INTRAVENOUS
Status: DISCONTINUED
Start: 2019-01-01 | End: 2019-01-01 | Stop reason: HOSPADM

## 2019-01-01 RX ORDER — METOPROLOL SUCCINATE 25 MG/1
25 TABLET, EXTENDED RELEASE ORAL DAILY
Qty: 90 TABLET | Refills: 0 | Status: SHIPPED | OUTPATIENT
Start: 2019-01-01

## 2019-01-01 RX ORDER — LIDOCAINE 50 MG/G
OINTMENT TOPICAL
Qty: 1 TUBE | Refills: 0 | Status: SHIPPED | OUTPATIENT
Start: 2019-01-01

## 2019-01-01 RX ORDER — FENTANYL CITRATE 50 UG/ML
INJECTION, SOLUTION INTRAMUSCULAR; INTRAVENOUS
Status: DISCONTINUED
Start: 2019-01-01 | End: 2019-01-01 | Stop reason: WASHOUT

## 2019-01-01 RX ORDER — ATORVASTATIN CALCIUM 40 MG/1
TABLET, FILM COATED ORAL
Qty: 90 TABLET | Refills: 0 | Status: SHIPPED | OUTPATIENT
Start: 2019-01-01

## 2019-01-01 RX ORDER — DILTIAZEM HYDROCHLORIDE 120 MG/1
CAPSULE, EXTENDED RELEASE ORAL
Qty: 90 CAPSULE | Refills: 0 | Status: SHIPPED | OUTPATIENT
Start: 2019-01-01

## 2019-01-01 RX ORDER — FENTANYL CITRATE 50 UG/ML
100 INJECTION, SOLUTION INTRAMUSCULAR; INTRAVENOUS ONCE
Status: COMPLETED | OUTPATIENT
Start: 2019-01-01 | End: 2019-01-01

## 2019-01-01 RX ORDER — BENZONATATE 100 MG/1
100 CAPSULE ORAL 2 TIMES DAILY PRN
Qty: 30 CAPSULE | Refills: 0 | Status: SHIPPED | OUTPATIENT
Start: 2019-01-01

## 2019-01-01 RX ORDER — FENTANYL CITRATE 50 UG/ML
50 INJECTION, SOLUTION INTRAMUSCULAR; INTRAVENOUS ONCE
Status: COMPLETED | OUTPATIENT
Start: 2019-01-01 | End: 2019-01-01

## 2019-01-01 RX ORDER — TRAZODONE HYDROCHLORIDE 50 MG/1
TABLET ORAL
Qty: 90 TABLET | Refills: 0 | Status: SHIPPED | OUTPATIENT
Start: 2019-01-01

## 2019-01-01 RX ORDER — SPIRONOLACTONE 25 MG/1
25 TABLET ORAL DAILY
Qty: 90 TABLET | Refills: 0 | Status: SHIPPED | OUTPATIENT
Start: 2019-01-01

## 2019-01-01 RX ADMIN — FENTANYL CITRATE 50 MCG: 50 INJECTION INTRAMUSCULAR; INTRAVENOUS at 20:39

## 2019-01-01 RX ADMIN — FENTANYL CITRATE 100 MCG: 50 INJECTION INTRAMUSCULAR; INTRAVENOUS at 20:15

## 2019-01-01 RX ADMIN — SODIUM BICARBONATE 50 MEQ: 84 INJECTION, SOLUTION INTRAVENOUS at 20:06

## 2019-04-30 NOTE — TELEPHONE ENCOUNTER
Pt is in [address] [place] [address] and has run out of test strips.     Needs script called

## 2019-05-02 NOTE — TELEPHONE ENCOUNTER
Spoke with pt daughter, pt will be back from [facility] ([facility]) in July

## 2019-05-22 NOTE — TELEPHONE ENCOUNTER
Patient needs siddhartha-auth on onglyza siddhartha-auth has  from last year mom wont have any pill by friday. Please call daughter is going down to [place] on Friday. Please call and advise.

## 2019-07-16 NOTE — PROGRESS NOTES
SUBJECTIVE:  Owen Campoverde is a 80 y.o. female [address] [place]   Chief Complaint   Patient presents with    Follow-up    Diabetes      PT HERE FOR EVAL  PT HERE WITH DAUGHTER        DM- [id number] / [place] . HBS 80'S. OCCASIONALLY 79. LAST EYE EXAM 6/18  HTN - TAKING  MEDS.  HEADACHE No, DIZZINESS No  HLP - TAKING MED. NO MUSCLE CRAMPS / NO MUSCLE ACHES  VIT D DEF - TAKING MED. LABS D/W PT  CKD - AVOIDING NSAIDS. PREVIOUS LABS D/W PT  CVA - HEMIPARESIS. TAKING AGGRENOX. DEPRESSION- TOLERATING MED.  INSOMNIA - AS ABOVE. DENIES SUICIDAL / NO HOMICIDAL THOUGHTS / IDEATION   INSOMNIA - TAKING TRAZODONE - HELPING  ANEMIA - CHRONIC. DENIES FATIGUE. PREVIOUS LABS D/W PT      DENIES CP, No SOB, No PALPITATIONS, No COUGH  No ABD PAIN, No N/V, No DIARRHEA, No CONSTIPATION, No MELENA, No HEMATOCHEZIA. No DYSURIA, No FREQ, No URGENCY, No HEMATURIA    PMH: REVIEWED AND UPDATED TODAY    PSH: REVIEWED AND UPDATED TODAY    SOCIAL HX: REVIEWED AND UPDATED TODAY    FAMILY HX: REVIEWED AND UPDATED TODAY    ALLERGY:  Ace inhibitors; Food; Losartan; Norvasc [amlodipine besylate]; Penicillins; Shrimp flavor; Sulfa antibiotics; and Tramadol    MEDS: REVIEWED  Prior to Visit Medications    Medication Sig Taking?  Authorizing Provider   blood glucose test strips (ACCU-CHEK JANAE PLUS) strip TEST BLOOD SUGAR TWICE DAILY Yes Claudia Reynolds MD   metoprolol succinate (TOPROL XL) 25 MG extended release tablet TAKE 1 TABLET BY MOUTH DAILY Yes Claudia Reynolds MD   ACCU-CHEK FASTCLIX LANCETS MISC TEST TWICE DAILY Yes Claudia Reynolds MD   saxagliptin (ONGLYZA) 5 MG TABS tablet Take 1 tablet by mouth daily Yes Claudia Reynolds MD   busPIRone (BUSPAR) 5 MG tablet TAKE 1 TABLET BY MOUTH EVERY DAY Yes Claudia Reynolds MD   hydrALAZINE (APRESOLINE) 100 MG tablet TAKE 1 TABLET BY MOUTH TWICE DAILY Yes Claudia Reynolds MD   atorvastatin (LIPITOR) 40 MG tablet TAKE 1 TABLET BY MOUTH EVERY EVENING Yes Claudia Reynolds MD

## 2019-07-16 NOTE — PATIENT INSTRUCTIONS
TAKE MED AS ADVISED    DIET/ EXERCISE.     FOLLOW UP WITHIN 3 MONTHS / AS NEEDED    FOLLOW UP FOR LABS, EYE EXAM

## 2019-08-21 PROBLEM — N18.4 CHRONIC KIDNEY DISEASE, STAGE IV (SEVERE) (HCC): Status: ACTIVE | Noted: 2019-01-01

## 2019-08-21 NOTE — PROGRESS NOTES
(AGGRENOX)  MG per extended release capsule TAKE 1 CAPSULE BY MOUTH TWICE DAILY( EVERY TWELVE HOURS) Yes Princess Christiana MD   sertraline (ZOLOFT) 100 MG tablet TAKE 1 TABLET BY MOUTH EVERY DAY Yes Princess Christiana MD   Cholecalciferol (VITAMIN D) 2000 units CAPS capsule Take 1 capsule by mouth daily Yes Princess Christiana MD   traZODone (DESYREL) 50 MG tablet TAKE 1 TABLET BY MOUTH EVERY NIGHT AS NEEDED FOR SLEEP Yes Princess Christiana MD   metoprolol succinate (TOPROL XL) 25 MG extended release tablet Take 1 tablet by mouth daily Yes Princess Christiana MD   hydrALAZINE (APRESOLINE) 100 MG tablet TAKE 1 TABLET BY MOUTH TWICE DAILY Yes Princess Christiana MD   atorvastatin (LIPITOR) 40 MG tablet TAKE 1 TABLET BY MOUTH EVERY EVENING Yes Princess Christiana MD   blood glucose test strips (ACCU-CHEK JANAE PLUS) strip TEST BLOOD SUGAR TWICE DAILY Yes Princess Christiana MD   [address] TEST TWICE DAILY Yes Princess Christiana MD   busPIRone (BUSPAR) 5 MG tablet TAKE 1 TABLET BY MOUTH EVERY DAY Yes Princess Christiana MD    MG capsule TAKE 1 CAPSULE BY MOUTH EVERY DAY Yes Princess Christiana MD   Blood Pressure Monitoring (ADULT BLOOD PRESSURE CUFF LG) KIT 1 each by Does not apply route daily Yes Princess Christiana MD   glucose monitoring kit (FREESTYLE) monitoring kit 1 kit by Does not apply route 2 times daily Yes Princess Christiana MD   baclofen (LIORESAL) 10 MG tablet TAKE 1 TABLET BY MOUTH EVERY NIGHT AT BEDTIME Yes Princess Christiana MD   acetaminophen (AMINOFEN) 325 MG tablet Take 2 tablets by mouth 2 times daily Yes Princess Christiana MD   loratadine (CLARITIN) 10 MG tablet Take 1 tablet by mouth daily as needed. Yes Princess Christiana MD   fluticasone [facility]) 50 MCG/ACT nasal spray 2 sprays by Nasal route daily as needed for Rhinitis. Yes Princess Christiana MD   Simethicone 62.5 MG STRP Take 62.5 mg by mouth daily as needed (for gas).  Yes Aiden Garcia MD   fluticasone-salmeterol (ADVAIR) 100-50 MCG/DOSE right-sided low back pain, with sciatica presence unspecified  COUNSELLED. NO ACUTE SKIN LESION ON EVAL   EXERCISES. ANALGESICS PRN. MONITOR. LIDOCAINE OINT PRN  ADVISED ON USE OF DONUT, FREQUENT POSITION CHANGE TO HELP PREVENT DECUBITUS ULCER  F/U IF PERSISTENT SYMPTOMS  MAKE CHANGES AS NEEDED. 2. Elevated lipase  COUNSELLED. CT NEGATIVE FOR PANCREATIC LESION  ONGLYZA D/MELITON. MONITOR LAB TO REEVAL  MAKE CHANGES AS NEEDED. 3. High serum vitamin D  COUNSELLED. D/C VT D SUPPLEMENT. MONITOR  MAKE CHANGES AS NEEDED. 4. Acute bronchitis due to other specified organisms / cough  COUNSELLED. ADVISED TESSALON PERLES PRN  START ON Z SILVANO X 5 DAYS. SYMPTOMATIC RX  MAKE CHANGES AS NEEDED. 5. Type 2 diabetes mellitus with other specified complication, without long-term current use of insulin ([facility]  COUNSELLED. MONITOR ON ACTOS  MED COMPLIANCE ADVISED  DIET/ EXERCISE. MONITOR. GOAL FBS 80- 120, HBA1C < 7  ADVISED ON HOME BLOOD SUGAR MONITORING  F/U DM EYE EXAM.  F/U LAB  MAKE CHANGES AS NEEDED. 6. Essential hypertension  COUNSELLED. CONTINUE MEDS. LOW NA+ / DASH DIET/ EXERCISE. MONITOR. GOAL </= 120/80  F/U LAB  MAKE CHANGES AS NEEDED. 7. Hyperlipidemia LDL goal <100  COUNSELLED. ADVISED LOW FAT / CHOL DIET/ EXERCISE.  MONITOR ON LIPITOR. GOALS D/W PT.  MAKE CHANGES AS NEEDED. 8. Chronic kidney disease, stage IV (severe) ([facility]    COUNSELLED. AVOID NSAIDS. LAB D/W PT / FAMILY. MONITOR. MAKE CHANGES AS NEEDED. 9. Cerebrovascular accident (CVA) due to other mechanism [facility])  COUNSELLED. CONTINUE AGRENOX  ADVISED RISK FACTOR MODIFICATIONS. MONITOR  MAKE CHANGES AS NEEDED. 10. Other depression  COUNSELLED. CONTINUE MGT  PT COUNSELLED  ON RELAXATION TECHNIQUES. ADDRESSED STRESS MGT. MONITOR  PT NOT SUICIDAL/ NOT HOMICIDAL  MAKE CHANGES AS NEEDED.            Quality & Risk Score Accuracy    Visit Dx:  N18.4 - Chronic kidney disease, stage IV (severe) ([facility])  Assessment and plan:
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
## 2019-08-22 NOTE — ED NOTES
Report called to [facility]. Pt transported to  via [facility]. Pt transported with pelvic binder on. 1 gram TXA given per [facility]. 1 unit FFP complete and 1 unit O negative infusing as pt leaves per [facility].       Claudia Craven RN  08/21/19 [id number]       Claudia Craven RN  08/21/19 2114
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
## 2019-08-28 LAB — EMERGENCY ISSUE BLOOD PRODUCTS SIGNED FORM: NORMAL
